# Patient Record
Sex: MALE | Race: WHITE
[De-identification: names, ages, dates, MRNs, and addresses within clinical notes are randomized per-mention and may not be internally consistent; named-entity substitution may affect disease eponyms.]

---

## 2020-08-14 ENCOUNTER — ERX REFILL RESPONSE (OUTPATIENT)
Age: 50
End: 2020-08-14

## 2020-08-14 RX ORDER — ONDANSETRON 4 MG/1
DISSOLVE ONE TABLET ON THE TONGUE THREE TIMES DAILY AS NEEDED TABLET, ORALLY DISINTEGRATING ORAL
Qty: 60 | Refills: 0

## 2020-09-17 ENCOUNTER — OFFICE VISIT (OUTPATIENT)
Dept: URBAN - METROPOLITAN AREA CLINIC 96 | Facility: CLINIC | Age: 50
End: 2020-09-17

## 2021-09-05 ENCOUNTER — WEB ENCOUNTER (OUTPATIENT)
Dept: URBAN - METROPOLITAN AREA CLINIC 113 | Facility: CLINIC | Age: 51
End: 2021-09-05

## 2021-09-13 ENCOUNTER — WEB ENCOUNTER (OUTPATIENT)
Dept: URBAN - METROPOLITAN AREA CLINIC 113 | Facility: CLINIC | Age: 51
End: 2021-09-13

## 2021-09-13 ENCOUNTER — OFFICE VISIT (OUTPATIENT)
Dept: URBAN - METROPOLITAN AREA CLINIC 113 | Facility: CLINIC | Age: 51
End: 2021-09-13
Payer: COMMERCIAL

## 2021-09-13 VITALS
BODY MASS INDEX: 32.59 KG/M2 | WEIGHT: 276 LBS | HEIGHT: 77 IN | HEART RATE: 79 BPM | SYSTOLIC BLOOD PRESSURE: 143 MMHG | DIASTOLIC BLOOD PRESSURE: 88 MMHG | TEMPERATURE: 97.5 F

## 2021-09-13 DIAGNOSIS — K58.0 IRRITABLE BOWEL SYNDROME WITH DIARRHEA: ICD-10-CM

## 2021-09-13 DIAGNOSIS — E83.119 HEMOCHROMATOSIS, UNSPECIFIED: ICD-10-CM

## 2021-09-13 DIAGNOSIS — K64.8 INTERNAL HEMORRHOIDS: ICD-10-CM

## 2021-09-13 DIAGNOSIS — K62.5 BRIGHT RED BLOOD PER RECTUM: ICD-10-CM

## 2021-09-13 PROCEDURE — 99214 OFFICE O/P EST MOD 30 MIN: CPT | Performed by: INTERNAL MEDICINE

## 2021-09-13 RX ORDER — ATENOLOL 25 MG/1
1 TABLET TABLET ORAL ONCE A DAY
Status: ACTIVE | COMMUNITY

## 2021-09-13 RX ORDER — ONDANSETRON 4 MG/1
DISSOLVE ONE TABLET ON THE TONGUE THREE TIMES DAILY AS NEEDED TABLET, ORALLY DISINTEGRATING ORAL
Qty: 60 | Refills: 0 | Status: ON HOLD | COMMUNITY

## 2021-09-13 RX ORDER — DESVENLAFAXINE 100 MG/1
1 TABLET TABLET, EXTENDED RELEASE ORAL ONCE A DAY
Status: ACTIVE | COMMUNITY

## 2021-09-13 NOTE — HPI-TODAY'S VISIT:
50yo male with a history of hemochromatosis, diarrhea predominant irritable bowel syndrome, complicated internal hemorrhoids s/p banding x3, adenomatous colon polyps due surveillance in 2022, presenting to [facility]. He was previously managed in the [facility] office by Dr. Jmienez.  He has a history of IBS-D, s/p cholecystectomy. In the past, his symptoms have responded to both Xifaxan and cholestyramine. He is having a daily nonbloody stool with Imodium 1 tablet as needed. About two weeks ago, he felt a "pop" in the rectum during a bowel movement. This resulted in the passage of a large volume of red blood. He started preparation H cream and suppositories and his symptoms have nearly subsided within the last 72 hours. He denies rectal pain. No abdominal pain, nausea or vomiting. He has a history of hemochromatosis and has received many phlebotomies over the years. He most recently underwent a double red cell (FRANCK) donation in May in [place].

## 2021-09-14 LAB
BASO (ABSOLUTE): 0.1
BASOS: 1
EOS (ABSOLUTE): 0.1
EOS: 1
FERRITIN, SERUM: 771
HEMATOCRIT: 46.6
HEMATOLOGY COMMENTS:: (no result)
HEMOGLOBIN: 16
IMMATURE CELLS: (no result)
IMMATURE GRANS (ABS): 0
IMMATURE GRANULOCYTES: 0
IRON BIND.CAP.(TIBC): 267
IRON SATURATION: 72
IRON: 193
LYMPHS (ABSOLUTE): 2.2
LYMPHS: 30
MCH: 34.4
MCHC: 34.3
MCV: 100
MONOCYTES(ABSOLUTE): 0.5
MONOCYTES: 7
NEUTROPHILS (ABSOLUTE): 4.3
NEUTROPHILS: 61
NRBC: (no result)
PLATELETS: 173
RBC: 4.65
RDW: 12
UIBC: 74
WBC: 7.1

## 2021-09-21 ENCOUNTER — WEB ENCOUNTER (OUTPATIENT)
Dept: URBAN - METROPOLITAN AREA CLINIC 113 | Facility: CLINIC | Age: 51
End: 2021-09-21

## 2021-09-21 ENCOUNTER — TELEPHONE ENCOUNTER (OUTPATIENT)
Dept: URBAN - METROPOLITAN AREA CLINIC 113 | Facility: CLINIC | Age: 51
End: 2021-09-21

## 2021-09-29 ENCOUNTER — WEB ENCOUNTER (OUTPATIENT)
Dept: URBAN - METROPOLITAN AREA CLINIC 113 | Facility: CLINIC | Age: 51
End: 2021-09-29

## 2021-09-29 RX ORDER — ONDANSETRON 4 MG/1
DISSOLVE ONE TABLET ON THE TONGUE THREE TIMES DAILY AS NEEDED TABLET, ORALLY DISINTEGRATING ORAL ONCE A DAY
Qty: 30 | Refills: 0

## 2021-10-04 ENCOUNTER — OFFICE VISIT (OUTPATIENT)
Dept: URBAN - METROPOLITAN AREA CLINIC 113 | Facility: CLINIC | Age: 51
End: 2021-10-04

## 2021-12-28 ENCOUNTER — LAB OUTSIDE AN ENCOUNTER (OUTPATIENT)
Dept: URBAN - METROPOLITAN AREA CLINIC 113 | Facility: CLINIC | Age: 51
End: 2021-12-28

## 2021-12-28 ENCOUNTER — OFFICE VISIT (OUTPATIENT)
Dept: URBAN - METROPOLITAN AREA CLINIC 113 | Facility: CLINIC | Age: 51
End: 2021-12-28
Payer: COMMERCIAL

## 2021-12-28 VITALS
HEIGHT: 77 IN | WEIGHT: 275 LBS | RESPIRATION RATE: 18 BRPM | SYSTOLIC BLOOD PRESSURE: 138 MMHG | DIASTOLIC BLOOD PRESSURE: 83 MMHG | HEART RATE: 79 BPM | BODY MASS INDEX: 32.47 KG/M2 | TEMPERATURE: 97.1 F

## 2021-12-28 DIAGNOSIS — D12.6 ADENOMATOUS POLYP OF COLON, UNSPECIFIED PART OF COLON: ICD-10-CM

## 2021-12-28 DIAGNOSIS — K58.0 IRRITABLE BOWEL SYNDROME WITH DIARRHEA: ICD-10-CM

## 2021-12-28 DIAGNOSIS — K64.9 HEMORRHOIDS: ICD-10-CM

## 2021-12-28 DIAGNOSIS — E83.119 HEMOCHROMATOSIS, UNSPECIFIED: ICD-10-CM

## 2021-12-28 PROCEDURE — 99214 OFFICE O/P EST MOD 30 MIN: CPT | Performed by: INTERNAL MEDICINE

## 2021-12-28 RX ORDER — INDOMETHACIN 25 MG/1
1 CAPSULE WITH FOOD OR MILK CAPSULE ORAL TWICE A DAY
Status: ACTIVE | COMMUNITY

## 2021-12-28 RX ORDER — COLESEVELAM HYDROCHLORIDE 625 MG/1
1 TABLET TABLET, FILM COATED ORAL ONCE A DAY
Qty: 30 | Refills: 11 | OUTPATIENT
Start: 2021-12-28

## 2021-12-28 RX ORDER — ONDANSETRON 4 MG/1
DISSOLVE ONE TABLET ON THE TONGUE THREE TIMES DAILY AS NEEDED TABLET, ORALLY DISINTEGRATING ORAL ONCE A DAY
Qty: 30 | Refills: 0 | Status: ACTIVE | COMMUNITY

## 2021-12-28 RX ORDER — LOSARTAN POTASSIUM 25 MG/1
1 TABLET TABLET ORAL ONCE A DAY
Status: ACTIVE | COMMUNITY

## 2021-12-28 RX ORDER — ATENOLOL 25 MG/1
1 TABLET TABLET ORAL ONCE A DAY
Status: ACTIVE | COMMUNITY

## 2021-12-28 RX ORDER — SODIUM, POTASSIUM,MAG SULFATES 17.5-3.13G
354 ML SOLUTION, RECONSTITUTED, ORAL ORAL ONCE
Qty: 354 MILLILITER | Refills: 0 | OUTPATIENT
Start: 2021-12-28 | End: 2021-12-29

## 2021-12-28 RX ORDER — DESVENLAFAXINE 100 MG/1
1 TABLET TABLET, EXTENDED RELEASE ORAL ONCE A DAY
Status: ACTIVE | COMMUNITY

## 2021-12-28 RX ORDER — DESVENLAFAXINE 50 MG/1
1 TABLET TABLET, EXTENDED RELEASE ORAL ONCE A DAY
Status: ACTIVE | COMMUNITY

## 2021-12-28 NOTE — HPI-TODAY'S VISIT:
50yo male with a history of hemochromatosis, diarrhea predominant irritable bowel syndrome, complicated internal hemorrhoids s/p banding x3, adenomatous colon polyps due surveillance in 2022, presenting for followup. He was previously managed in the [facility] office by Dr. Jimenez. . He has a history of IBS-D, s/p cholecystectomy. In the past, his symptoms have responded to both Xifaxan and cholestyramine. He is having a daily nonbloody stool with Imodium 1 tablet as needed.  . No abdominal pain, nausea or vomiting. He has a history of hemochromatosis and has received many phlebotomies over the years.  . He most recently underwent a double red cell (FRANCK) donation in June in [place]. Last run was delayed due to his father's death. . Labs December 2021 albumin 4.5, total bilirubin 1.1, alkaline phosphatase 78, AST 24, ALT 36.  Hemoglobin 17.1, platelets 230,000.  Ferritin 544.  Hemoglobin A1c 4.7.  Uric acid normal at 7.0  . Flexible anoscopy 2018.  Normal.  Biopsies were notable for mild ischemic colitis interestingly .  Hemorrhoidal banding performed 2017 . Colonoscopy 2017.  5 mm transverse colonic polyp.  Random biopsies were normal. . CT enterography - September 2015

## 2022-03-15 ENCOUNTER — TELEPHONE ENCOUNTER (OUTPATIENT)
Dept: URBAN - METROPOLITAN AREA CLINIC 113 | Facility: CLINIC | Age: 52
End: 2022-03-15

## 2022-03-21 ENCOUNTER — OFFICE VISIT (OUTPATIENT)
Dept: URBAN - METROPOLITAN AREA SURGERY CENTER 25 | Facility: SURGERY CENTER | Age: 52
End: 2022-03-21

## 2022-05-18 ENCOUNTER — WEB ENCOUNTER (OUTPATIENT)
Dept: URBAN - METROPOLITAN AREA CLINIC 113 | Facility: CLINIC | Age: 52
End: 2022-05-18

## 2022-05-18 RX ORDER — ONDANSETRON 4 MG/1
DISSOLVE ONE TABLET ON THE TONGUE THREE TIMES DAILY AS NEEDED TABLET, ORALLY DISINTEGRATING ORAL ONCE A DAY
Qty: 30 | Refills: 0

## 2022-08-03 ENCOUNTER — ERX REFILL RESPONSE (OUTPATIENT)
Dept: URBAN - METROPOLITAN AREA CLINIC 113 | Facility: CLINIC | Age: 52
End: 2022-08-03

## 2022-08-03 RX ORDER — ONDANSETRON 4 MG/1
DISSOLVE ONE TABLET ON THE TONGUE THREE TIMES DAILY AS NEEDED TABLET, ORALLY DISINTEGRATING ORAL ONCE A DAY
Qty: 30 | Refills: 0 | OUTPATIENT

## 2022-08-03 RX ORDER — ONDANSETRON 4 MG/1
DISSOLVE ONE TABLET ON THE TONGUE THREE TIMES A DAY AS NEEDED TABLET, ORALLY DISINTEGRATING ORAL
Qty: 30 TABLET | Refills: 11 | OUTPATIENT

## 2022-08-15 ENCOUNTER — OFFICE VISIT (OUTPATIENT)
Dept: URBAN - METROPOLITAN AREA SURGERY CENTER 25 | Facility: SURGERY CENTER | Age: 52
End: 2022-08-15
Payer: COMMERCIAL

## 2022-08-15 ENCOUNTER — CLAIMS CREATED FROM THE CLAIM WINDOW (OUTPATIENT)
Dept: URBAN - METROPOLITAN AREA CLINIC 4 | Facility: CLINIC | Age: 52
End: 2022-08-15
Payer: COMMERCIAL

## 2022-08-15 ENCOUNTER — LAB OUTSIDE AN ENCOUNTER (OUTPATIENT)
Dept: URBAN - METROPOLITAN AREA CLINIC 113 | Facility: CLINIC | Age: 52
End: 2022-08-15

## 2022-08-15 ENCOUNTER — TELEPHONE ENCOUNTER (OUTPATIENT)
Dept: URBAN - METROPOLITAN AREA CLINIC 113 | Facility: CLINIC | Age: 52
End: 2022-08-15

## 2022-08-15 DIAGNOSIS — K63.89 OTHER SPECIFIED DISEASES OF INTESTINE: ICD-10-CM

## 2022-08-15 DIAGNOSIS — D12.4 ADENOMA OF DESCENDING COLON: ICD-10-CM

## 2022-08-15 DIAGNOSIS — Z86.010 ADENOMAS PERSONAL HISTORY OF COLONIC POLYPS: ICD-10-CM

## 2022-08-15 DIAGNOSIS — D12.4 BENIGN NEOPLASM OF DESCENDING COLON: ICD-10-CM

## 2022-08-15 PROCEDURE — 88305 TISSUE EXAM BY PATHOLOGIST: CPT | Performed by: PATHOLOGY

## 2022-08-15 PROCEDURE — 45380 COLONOSCOPY AND BIOPSY: CPT | Performed by: INTERNAL MEDICINE

## 2022-08-15 PROCEDURE — G8907 PT DOC NO EVENTS ON DISCHARG: HCPCS | Performed by: INTERNAL MEDICINE

## 2022-08-15 RX ORDER — ONDANSETRON 4 MG/1
DISSOLVE ONE TABLET ON THE TONGUE THREE TIMES A DAY AS NEEDED TABLET, ORALLY DISINTEGRATING ORAL
Qty: 30 TABLET | Refills: 11 | Status: ACTIVE | COMMUNITY

## 2022-08-15 RX ORDER — DESVENLAFAXINE 100 MG/1
1 TABLET TABLET, EXTENDED RELEASE ORAL ONCE A DAY
Status: ACTIVE | COMMUNITY

## 2022-08-15 RX ORDER — LOSARTAN POTASSIUM 25 MG/1
1 TABLET TABLET ORAL ONCE A DAY
Status: ACTIVE | COMMUNITY

## 2022-08-15 RX ORDER — DESVENLAFAXINE 50 MG/1
1 TABLET TABLET, EXTENDED RELEASE ORAL ONCE A DAY
Status: ACTIVE | COMMUNITY

## 2022-08-15 RX ORDER — ATENOLOL 25 MG/1
1 TABLET TABLET ORAL ONCE A DAY
Status: ACTIVE | COMMUNITY

## 2022-08-15 RX ORDER — INDOMETHACIN 25 MG/1
1 CAPSULE WITH FOOD OR MILK CAPSULE ORAL TWICE A DAY
Status: ACTIVE | COMMUNITY

## 2022-08-15 RX ORDER — COLESEVELAM HYDROCHLORIDE 625 MG/1
1 TABLET TABLET, FILM COATED ORAL ONCE A DAY
Qty: 30 | Refills: 11 | Status: ACTIVE | COMMUNITY
Start: 2021-12-28

## 2022-08-17 ENCOUNTER — TELEPHONE ENCOUNTER (OUTPATIENT)
Dept: URBAN - METROPOLITAN AREA CLINIC 113 | Facility: CLINIC | Age: 52
End: 2022-08-17

## 2022-12-01 ENCOUNTER — LAB OUTSIDE AN ENCOUNTER (OUTPATIENT)
Dept: URBAN - METROPOLITAN AREA CLINIC 113 | Facility: CLINIC | Age: 52
End: 2022-12-01

## 2022-12-01 ENCOUNTER — OFFICE VISIT (OUTPATIENT)
Dept: URBAN - METROPOLITAN AREA CLINIC 113 | Facility: CLINIC | Age: 52
End: 2022-12-01
Payer: COMMERCIAL

## 2022-12-01 VITALS
HEART RATE: 73 BPM | HEIGHT: 77 IN | TEMPERATURE: 98.3 F | RESPIRATION RATE: 20 BRPM | SYSTOLIC BLOOD PRESSURE: 133 MMHG | DIASTOLIC BLOOD PRESSURE: 83 MMHG | WEIGHT: 272 LBS | BODY MASS INDEX: 32.12 KG/M2

## 2022-12-01 DIAGNOSIS — R21 RASH: ICD-10-CM

## 2022-12-01 DIAGNOSIS — E83.119 HEMOCHROMATOSIS, UNSPECIFIED: ICD-10-CM

## 2022-12-01 DIAGNOSIS — D12.6 ADENOMATOUS POLYP OF COLON, UNSPECIFIED PART OF COLON: ICD-10-CM

## 2022-12-01 DIAGNOSIS — K64.9 HEMORRHOIDS: ICD-10-CM

## 2022-12-01 DIAGNOSIS — Z86.010 ADENOMAS PERSONAL HISTORY OF COLONIC POLYPS: ICD-10-CM

## 2022-12-01 DIAGNOSIS — K58.0 IRRITABLE BOWEL SYNDROME WITH DIARRHEA: ICD-10-CM

## 2022-12-01 PROCEDURE — 99214 OFFICE O/P EST MOD 30 MIN: CPT | Performed by: INTERNAL MEDICINE

## 2022-12-01 RX ORDER — INDOMETHACIN 25 MG/1
1 CAPSULE WITH FOOD OR MILK CAPSULE ORAL TWICE A DAY
Status: DISCONTINUED | COMMUNITY

## 2022-12-01 RX ORDER — NYSTATIN 100000 [USP'U]/G
1 APPLICATION POWDER TOPICAL TWICE A DAY
Qty: 60 GM | Refills: 3 | OUTPATIENT
Start: 2022-12-01 | End: 2023-03-31

## 2022-12-01 RX ORDER — DESVENLAFAXINE 50 MG/1
1 TABLET TABLET, EXTENDED RELEASE ORAL ONCE A DAY
Status: ACTIVE | COMMUNITY

## 2022-12-01 RX ORDER — ALLOPURINOL 200 MG/1
1 TABLET TABLET ORAL ONCE A DAY
Status: ACTIVE | COMMUNITY

## 2022-12-01 RX ORDER — DESVENLAFAXINE 100 MG/1
1 TABLET TABLET, EXTENDED RELEASE ORAL ONCE A DAY
Status: ACTIVE | COMMUNITY

## 2022-12-01 RX ORDER — LORAZEPAM 0.5 MG/1
1 TABLET AT BEDTIME AS NEEDED TABLET ORAL ONCE A DAY
Status: ACTIVE | COMMUNITY

## 2022-12-01 RX ORDER — COLESEVELAM HYDROCHLORIDE 625 MG/1
1 TABLET TABLET, FILM COATED ORAL ONCE A DAY
Qty: 30 | Refills: 11 | Status: ACTIVE | COMMUNITY
Start: 2021-12-28

## 2022-12-01 RX ORDER — ONDANSETRON 4 MG/1
DISSOLVE ONE TABLET ON THE TONGUE THREE TIMES A DAY AS NEEDED TABLET, ORALLY DISINTEGRATING ORAL
Qty: 30 TABLET | Refills: 11 | Status: ACTIVE | COMMUNITY

## 2022-12-01 RX ORDER — ATENOLOL 50 MG/1
1 TABLET TABLET ORAL ONCE A DAY
Status: ACTIVE | COMMUNITY

## 2022-12-01 RX ORDER — LOSARTAN POTASSIUM 25 MG/1
1 TABLET TABLET ORAL ONCE A DAY
Status: ACTIVE | COMMUNITY

## 2022-12-01 NOTE — HPI-TODAY'S VISIT:
51yo male with a history of hemochromatosis, diarrhea predominant irritable bowel syndrome, complicated internal hemorrhoids s/p banding x3, adenomatous colon polyps due surveillance in 2022, presenting for followup. He was previously managed in the [facility] office by Dr. Jimenez. . He has a history of IBS-D, s/p cholecystectomy. In the past, his symptoms have responded to both Xifaxan and cholestyramine. He is having a daily nonbloody stool with Imodium 1 tablet as needed. Now diarrhea is much better on welchol daily. He uses ondansetron prn with good effect. . He has a history of hemochromatosis and has received many phlebotomies over the years.  . He most recently underwent a double red cell (FRANCK) donation in June in [place]. Last run was delayed due to his father's death. . Labs December 2021 albumin 4.5, total bilirubin 1.1, alkaline phosphatase 78, AST 24, ALT 36.  Hemoglobin 17.1, platelets 230,000.  Ferritin 544.  Hemoglobin A1c 4.7.  Uric acid normal at 7.0  . Flexible anoscopy 2018.  Normal.  Biopsies were notable for mild ischemic colitis interestingly . Hemorrhoidal banding performed 2017 . Colonoscopy 2017.  5 mm transverse colonic polyp.  Random biopsies were normal. . CT enterography - September 2015

## 2022-12-01 NOTE — HPI-TODAY'S VISIT:
. Labs August 2022.  Iron 195, iron saturation 69%, ferritin 892. . Labs August 2022.  Iron 195, iron saturation 69%, ferritin 892.Colonoscopy August 2022.  2 mm descending colon polyp.  Normal colonic mucosa.  Random biopsies were negative for colitis.  The polyp was adenomatous. . Labs April 2022.  ALT 38, AST 25, alkaline phosphatase 111.  TSH 1.2.  Ferritin 699, iron 237.  Hemoglobin 16.1.  Hemoglobin 15.6 . EGD ~ 2012 [facility]. Normal. Per his report

## 2022-12-02 LAB
A/G RATIO: 2.5
AFP, SERUM, TUMOR MARKER: 3
ALBUMIN: 4.3
ALKALINE PHOSPHATASE: 79
ALT (SGPT): 42
AST (SGOT): 26
BILIRUBIN, TOTAL: 0.7
BUN/CREATININE RATIO: (no result)
BUN: 10
CALCIUM: 9.1
CARBON DIOXIDE, TOTAL: 23
CHLORIDE: 105
CREATININE: 0.84
EGFR: 104
FERRITIN, SERUM: 819
GLOBULIN, TOTAL: 1.7
GLUCOSE: 87
HEMATOCRIT: 42.7
HEMOGLOBIN: 15.3
IMMUNOGLOBULIN A: 82
IMMUNOGLOBULIN A: 82
IMMUNOGLOBULIN G: 590
IMMUNOGLOBULIN M: 54
INR: 0.9
INTERPRETATION: (no result)
IRON BIND.CAP.(TIBC): 269
IRON SATURATION: 76
IRON: 204
MCH: 35.4
MCHC: 35.8
MCV: 98.8
MPV: 10.3
PLATELET COUNT: 187
POTASSIUM: 4.1
PROTEIN, TOTAL: 6
PT: 9.8
RDW: 11.8
RED BLOOD CELL COUNT: 4.32
SODIUM: 137
TISSUE TRANSGLUTAMINASE AB, IGA: <1
WHITE BLOOD CELL COUNT: 7.1

## 2022-12-06 PROBLEM — 428283002 HISTORY OF POLYP OF COLON: Status: ACTIVE | Noted: 2022-08-23

## 2022-12-06 PROBLEM — 92065004 BENIGN NEOPLASM OF COLON: Status: ACTIVE | Noted: 2021-12-28

## 2022-12-06 PROBLEM — 197125005 IRRITABLE BOWEL SYNDROME WITH DIARRHEA: Status: ACTIVE | Noted: 2021-09-13

## 2022-12-15 ENCOUNTER — TELEPHONE ENCOUNTER (OUTPATIENT)
Dept: URBAN - METROPOLITAN AREA CLINIC 113 | Facility: CLINIC | Age: 52
End: 2022-12-15

## 2022-12-15 ENCOUNTER — LAB OUTSIDE AN ENCOUNTER (OUTPATIENT)
Dept: URBAN - METROPOLITAN AREA CLINIC 113 | Facility: CLINIC | Age: 52
End: 2022-12-15

## 2022-12-30 ENCOUNTER — WEB ENCOUNTER (OUTPATIENT)
Dept: URBAN - METROPOLITAN AREA SURGERY CENTER 25 | Facility: SURGERY CENTER | Age: 52
End: 2022-12-30

## 2023-01-11 ENCOUNTER — WEB ENCOUNTER (OUTPATIENT)
Dept: URBAN - METROPOLITAN AREA CLINIC 113 | Facility: CLINIC | Age: 53
End: 2023-01-11

## 2023-01-12 ENCOUNTER — LAB OUTSIDE AN ENCOUNTER (OUTPATIENT)
Dept: URBAN - METROPOLITAN AREA CLINIC 113 | Facility: CLINIC | Age: 53
End: 2023-01-12

## 2023-02-01 ENCOUNTER — LAB OUTSIDE AN ENCOUNTER (OUTPATIENT)
Dept: URBAN - METROPOLITAN AREA SURGERY CENTER 25 | Facility: SURGERY CENTER | Age: 53
End: 2023-02-01

## 2023-02-01 PROBLEM — 399187006 HEMOCHROMATOSIS: Status: ACTIVE | Noted: 2021-09-13

## 2023-02-01 PROBLEM — 85057007 LIVER CYST: Status: ACTIVE | Noted: 2023-02-01

## 2023-02-07 ENCOUNTER — WEB ENCOUNTER (OUTPATIENT)
Dept: URBAN - METROPOLITAN AREA CLINIC 113 | Facility: CLINIC | Age: 53
End: 2023-02-07

## 2023-02-07 ENCOUNTER — WEB ENCOUNTER (OUTPATIENT)
Dept: URBAN - METROPOLITAN AREA SURGERY CENTER 25 | Facility: SURGERY CENTER | Age: 53
End: 2023-02-07

## 2023-03-13 ENCOUNTER — OFFICE VISIT (OUTPATIENT)
Dept: URBAN - METROPOLITAN AREA CLINIC 113 | Facility: CLINIC | Age: 53
End: 2023-03-13
Payer: COMMERCIAL

## 2023-03-13 VITALS
RESPIRATION RATE: 20 BRPM | HEIGHT: 77 IN | SYSTOLIC BLOOD PRESSURE: 138 MMHG | DIASTOLIC BLOOD PRESSURE: 74 MMHG | HEART RATE: 89 BPM | WEIGHT: 278.2 LBS | TEMPERATURE: 97.3 F | BODY MASS INDEX: 32.85 KG/M2

## 2023-03-13 DIAGNOSIS — D12.6 ADENOMATOUS POLYP OF COLON, UNSPECIFIED PART OF COLON: ICD-10-CM

## 2023-03-13 DIAGNOSIS — R21 RASH: ICD-10-CM

## 2023-03-13 DIAGNOSIS — N28.89 KIDNEY MASS: ICD-10-CM

## 2023-03-13 DIAGNOSIS — K64.9 HEMORRHOIDS: ICD-10-CM

## 2023-03-13 DIAGNOSIS — E83.119 HEMOCHROMATOSIS, UNSPECIFIED: ICD-10-CM

## 2023-03-13 DIAGNOSIS — K76.89 LIVER CYST: ICD-10-CM

## 2023-03-13 DIAGNOSIS — K58.0 IRRITABLE BOWEL SYNDROME WITH DIARRHEA: ICD-10-CM

## 2023-03-13 DIAGNOSIS — Z86.010 ADENOMAS PERSONAL HISTORY OF COLONIC POLYPS: ICD-10-CM

## 2023-03-13 PROCEDURE — 99214 OFFICE O/P EST MOD 30 MIN: CPT | Performed by: INTERNAL MEDICINE

## 2023-03-13 RX ORDER — COLESEVELAM HYDROCHLORIDE 625 MG/1
1 TABLET TABLET, FILM COATED ORAL ONCE A DAY
Qty: 30 | Refills: 11 | Status: ACTIVE | COMMUNITY
Start: 2021-12-28

## 2023-03-13 RX ORDER — ONDANSETRON 4 MG/1
DISSOLVE ONE TABLET ON THE TONGUE THREE TIMES A DAY AS NEEDED TABLET, ORALLY DISINTEGRATING ORAL
Qty: 30 TABLET | Refills: 11 | Status: ACTIVE | COMMUNITY

## 2023-03-13 RX ORDER — LOSARTAN POTASSIUM 25 MG/1
1 TABLET TABLET ORAL ONCE A DAY
Status: ACTIVE | COMMUNITY

## 2023-03-13 RX ORDER — LORAZEPAM 0.5 MG/1
1 TABLET AT BEDTIME AS NEEDED TABLET ORAL ONCE A DAY
Status: ACTIVE | COMMUNITY

## 2023-03-13 RX ORDER — DESVENLAFAXINE 50 MG/1
1 TABLET TABLET, EXTENDED RELEASE ORAL ONCE A DAY
Status: ACTIVE | COMMUNITY

## 2023-03-13 RX ORDER — ALLOPURINOL 200 MG/1
1 TABLET TABLET ORAL ONCE A DAY
Status: ACTIVE | COMMUNITY

## 2023-03-13 RX ORDER — ATENOLOL 50 MG/1
1 TABLET TABLET ORAL ONCE A DAY
Status: ACTIVE | COMMUNITY

## 2023-03-13 RX ORDER — DESVENLAFAXINE 100 MG/1
1 TABLET TABLET, EXTENDED RELEASE ORAL ONCE A DAY
Status: ACTIVE | COMMUNITY

## 2023-03-13 RX ORDER — NYSTATIN 100000 [USP'U]/G
1 APPLICATION POWDER TOPICAL TWICE A DAY
Qty: 60 GM | Refills: 3 | Status: ACTIVE | COMMUNITY
Start: 2022-12-01 | End: 2023-03-31

## 2023-03-13 NOTE — HPI-TODAY'S VISIT:
51yo male with a history of hemochromatosis, diarrhea predominant irritable bowel syndrome, complicated internal hemorrhoids s/p banding x3, adenomatous colon polyps due surveillance in 2022, presenting for followup. He was previously managed in the [facility] office by Dr. Jimenez. . Doing fairly well from a GI perspective.  He does have irritable bowel syndrome.  We talked about the FODMAP diet in some detail today.  No dysphagia heartburn or regurgitation.  No abdominal pain at this time.  Not compliant with phlebotomy.  Busy with work.  Promises me that he will get phlebotomy performed in the second quarter of 2023.  Nausea is responsive to ondansetron. . Now on colesavelam. Uses ondansetron prn for nausea. .

## 2023-03-13 NOTE — HPI-OTHER HISTORIES
. MRI abdomen with and without contrast February 2023.  Right renal lesion corresponding to prominent cortical lobulation.  No mass was noted.  Moderate diffuse hepatic steatosis noted..  Borderline splenomegaly. . Abdominal ultrasound elastography.  December 2022.  Fatty liver noted.  F1.  Elastography score 6.83K PA.  Possible mass seen in the right kidney. . Colonoscopy August 2022.  2 mm sessile descending colon polyp.  This was cold biopsied.  Normal colonic mucosa.  Terminal ileum was normal.  Random biopsies were negative.  Descending polyp was a tubular adenoma. . He has a history of hemochromatosis and has received many phlebotomies over the years.  . He most recently underwent a double red cell (FRANCK) donation in June in [place]. Last run was delayed due to his father's death. . Labs August 2022.  Iron 195, iron saturation 69%, ferritin 892. . Labs August 2022.  Iron 195, iron saturation 69%, ferritin 892.Colonoscopy August 2022.  2 mm descending colon polyp.  Normal colonic mucosa.  Random biopsies were negative for colitis.  The polyp was adenomatous. . Labs April 2022.  ALT 38, AST 25, alkaline phosphatase 111.  TSH 1.2.  Ferritin 699, iron 237.  Hemoglobin 16.1.  Hemoglobin 15.6 . EGD ~ 2012 [facility]. Normal. Per his report . Labs December 2021 albumin 4.5, total bilirubin 1.1, alkaline phosphatase 78, AST 24, ALT 36.  Hemoglobin 17.1, platelets 230,000.  Ferritin 544.  Hemoglobin A1c 4.7.  Uric acid normal at 7.0  . Labs 3/4/21: H/H 17.9/50.3, MCV 99.2. Iron 222, TIBC 292, iron sat 76, ferritin 709. . Flexible anoscopy 2018.  Normal.  Biopsies were notable for mild ischemic colitis interestingly . Hemorrhoidal banding performed 2017 . Colonoscopy 2017.  5 mm transverse colonic polyp.  Random biopsies were normal. . CT enterography - September 2015

## 2023-04-25 ENCOUNTER — WEB ENCOUNTER (OUTPATIENT)
Dept: URBAN - METROPOLITAN AREA CLINIC 113 | Facility: CLINIC | Age: 53
End: 2023-04-25

## 2023-04-29 ENCOUNTER — WEB ENCOUNTER (OUTPATIENT)
Dept: URBAN - METROPOLITAN AREA SURGERY CENTER 25 | Facility: SURGERY CENTER | Age: 53
End: 2023-04-29

## 2023-05-03 ENCOUNTER — WEB ENCOUNTER (OUTPATIENT)
Dept: URBAN - METROPOLITAN AREA SURGERY CENTER 25 | Facility: SURGERY CENTER | Age: 53
End: 2023-05-03

## 2023-05-11 ENCOUNTER — WEB ENCOUNTER (OUTPATIENT)
Dept: URBAN - METROPOLITAN AREA SURGERY CENTER 25 | Facility: SURGERY CENTER | Age: 53
End: 2023-05-11

## 2023-05-24 ENCOUNTER — WEB ENCOUNTER (OUTPATIENT)
Dept: URBAN - METROPOLITAN AREA SURGERY CENTER 25 | Facility: SURGERY CENTER | Age: 53
End: 2023-05-24

## 2023-08-04 ENCOUNTER — ERX REFILL RESPONSE (OUTPATIENT)
Dept: URBAN - METROPOLITAN AREA CLINIC 113 | Facility: CLINIC | Age: 53
End: 2023-08-04

## 2023-08-04 RX ORDER — ONDANSETRON 4 MG/1
DISSOLVE ONE TABLET BY MOUTH THREE TIMES A DAY AS NEEDED TABLET, ORALLY DISINTEGRATING ORAL
Qty: 30 TABLET | Refills: 11 | OUTPATIENT

## 2023-08-04 RX ORDER — ONDANSETRON 4 MG/1
DISSOLVE ONE TABLET ON THE TONGUE THREE TIMES A DAY AS NEEDED TABLET, ORALLY DISINTEGRATING ORAL
Qty: 30 TABLET | Refills: 11 | OUTPATIENT

## 2023-09-18 ENCOUNTER — LAB OUTSIDE AN ENCOUNTER (OUTPATIENT)
Dept: URBAN - METROPOLITAN AREA CLINIC 113 | Facility: CLINIC | Age: 53
End: 2023-09-18

## 2023-09-18 ENCOUNTER — OFFICE VISIT (OUTPATIENT)
Dept: URBAN - METROPOLITAN AREA CLINIC 113 | Facility: CLINIC | Age: 53
End: 2023-09-18
Payer: COMMERCIAL

## 2023-09-18 VITALS
WEIGHT: 286.4 LBS | BODY MASS INDEX: 33.82 KG/M2 | SYSTOLIC BLOOD PRESSURE: 142 MMHG | DIASTOLIC BLOOD PRESSURE: 85 MMHG | HEART RATE: 72 BPM | HEIGHT: 77 IN | TEMPERATURE: 97.1 F

## 2023-09-18 DIAGNOSIS — K58.0 IRRITABLE BOWEL SYNDROME WITH DIARRHEA: ICD-10-CM

## 2023-09-18 DIAGNOSIS — E78.5 HYPERLIPIDEMIA, UNSPECIFIED HYPERLIPIDEMIA TYPE: ICD-10-CM

## 2023-09-18 DIAGNOSIS — E83.110 HEREDITARY HEMOCHROMATOSIS: ICD-10-CM

## 2023-09-18 PROBLEM — 55822004: Status: ACTIVE | Noted: 2023-09-18

## 2023-09-18 PROCEDURE — 99214 OFFICE O/P EST MOD 30 MIN: CPT | Performed by: NURSE PRACTITIONER

## 2023-09-18 RX ORDER — ONDANSETRON 4 MG/1
DISSOLVE ONE TABLET BY MOUTH THREE TIMES A DAY AS NEEDED TABLET, ORALLY DISINTEGRATING ORAL
Qty: 30 TABLET | Refills: 11 | Status: ACTIVE | COMMUNITY

## 2023-09-18 RX ORDER — LOSARTAN POTASSIUM 50 MG/1
1 TABLET TABLET ORAL ONCE A DAY
Status: ACTIVE | COMMUNITY

## 2023-09-18 RX ORDER — LORAZEPAM 0.5 MG/1
1 TABLET AT BEDTIME AS NEEDED TABLET ORAL ONCE A DAY
Status: ON HOLD | COMMUNITY

## 2023-09-18 RX ORDER — COLESEVELAM HYDROCHLORIDE 625 MG/1
1 TABLET TABLET, FILM COATED ORAL ONCE A DAY
Qty: 30 | Refills: 11 | Status: ON HOLD | COMMUNITY
Start: 2021-12-28

## 2023-09-18 RX ORDER — ATENOLOL 50 MG/1
1 TABLET TABLET ORAL ONCE A DAY
Status: ACTIVE | COMMUNITY

## 2023-09-18 RX ORDER — ALLOPURINOL 200 MG/1
1 TABLET TABLET ORAL ONCE A DAY
Status: ACTIVE | COMMUNITY

## 2023-09-18 RX ORDER — DESVENLAFAXINE 100 MG/1
1 TABLET TABLET, EXTENDED RELEASE ORAL ONCE A DAY
Status: ACTIVE | COMMUNITY

## 2023-09-18 RX ORDER — LOSARTAN POTASSIUM 25 MG/1
1 TABLET TABLET ORAL ONCE A DAY
Status: ON HOLD | COMMUNITY

## 2023-09-18 RX ORDER — DESVENLAFAXINE 50 MG/1
1 TABLET TABLET, EXTENDED RELEASE ORAL ONCE A DAY
Status: ON HOLD | COMMUNITY

## 2023-09-18 NOTE — HPI-TODAY'S VISIT:
He was seen in the office in March for routine follow-up regarding hemochromatosis and IBS-D.  Recent ultrasound with elastography was negative for fibrosis.  He was encouraged to improve compliance with phlebotomy, with plan for phlebotomy weekly x4 then every other week x4 until his ferritin was under 100.  His symptoms related to IBS-D were overall managed with WelChol.  He was encouraged a low FODMAP diet. He is doing fairly well from a GI standpoint. His symptoms related to irritable bowel syndrome are overall managed with diet. No abdominal pain, nausea or vomiting. His diarrhea is managed with Imodium 1-2x per week. He has not experienced any hemorrhoid issues since the time of the last visit. No rectal bleeding. His occasional nausea, which is worsened with air travel, responds to ondansetron when needed. He completed a few rounds of phlebotomy over the summer and resumed double [facility]CK[facility]. He is only able to donate RBCs every 90 days. Most recent labs within the chart from December 2022 show H/H15.3/42.7, MCV 98.8, , WBC 7.1.  CMP unremarkable with normal LFTs, AST 26, ALT 42, ALP 79, T. bili 0.7.  Ferritin 819.  aFP 3.0.  Iron 204, TIBC 269, iron sat 76.  Negative celiac serologies.  PT/INR 9.8/0.9.

## 2023-09-18 NOTE — HPI-OTHER HISTORIES
. MRI abdomen with and without contrast February 2023.  Right renal lesion corresponding to prominent cortical lobulation.  No mass was noted.  Moderate diffuse hepatic steatosis noted..  Borderline splenomegaly. . Abdominal ultrasound elastography.  December 2022.  Fatty liver noted.  F1.  Elastography score 6.83K PA.  Possible mass seen in the right kidney. . Colonoscopy August 2022.  2 mm sessile descending colon polyp.  This was cold biopsied.  Normal colonic mucosa.  Terminal ileum was normal.  Random biopsies were negative.  Descending polyp was a tubular adenoma. . He has a history of hemochromatosis and has received many phlebotomies over the years.  . He most recently underwent a double red cell (FRANCK) donation in June in [place]. Last run was delayed due to his father's death. . Labs August 2022.  Iron 195, iron saturation 69%, ferritin 892. . Labs August 2022.  Iron 195, iron saturation 69%, ferritin 892.Colonoscopy August 2022.  2 mm descending colon polyp.  Normal colonic mucosa.  Random biopsies were negative for colitis.  The polyp was adenomatous. . Labs April 2022.  ALT 38, AST 25, alkaline phosphatase 111.  TSH 1.2.  Ferritin 699, iron 237.  Hemoglobin 16.1.  Hemoglobin 15.6 . EGD ~ 2012 [facility]. Normal. Per his report . Labs December 2021 albumin 4.5, total bilirubin 1.1, alkaline phosphatase 78, AST 24, ALT 36.  Hemoglobin 17.1, platelets 230,000.  Ferritin 544.  Hemoglobin A1c 4.7.  Uric acid normal at 7.0  . Labs 3/4/21: H/H 17.9/50.3, MCV 99.2. Iron 222, TIBC 292, iron sat 76, ferritin 709. . Flexible anoscopy 2018.  Normal.  Biopsies were notable for mild ischemic colitis interestingly . Hemorrhoidal banding performed 2017 . Colonoscopy 2017.  5 mm transverse colonic polyp.  Random biopsies were normal. . CT enterography - September 2015

## 2023-09-18 NOTE — HPI-TODAY'S VISIT:
52yo male with a history of hemochromatosis, diarrhea predominant irritable bowel syndrome, complicated internal hemorrhoids s/p banding x3, adenomatous colon polyps, presenting for followup.

## 2023-09-19 ENCOUNTER — TELEPHONE ENCOUNTER (OUTPATIENT)
Dept: URBAN - METROPOLITAN AREA CLINIC 113 | Facility: CLINIC | Age: 53
End: 2023-09-19

## 2023-09-19 LAB
A/G RATIO: 2
ABSOLUTE BASOPHILS: 71
ABSOLUTE EOSINOPHILS: 71
ABSOLUTE LYMPHOCYTES: 1617
ABSOLUTE MONOCYTES: 389
ABSOLUTE NEUTROPHILS: 3752
ALBUMIN: 4.3
ALKALINE PHOSPHATASE: 76
ALT (SGPT): 47
AST (SGOT): 36
BASOPHILS: 1.2
BILIRUBIN, TOTAL: 0.8
BUN/CREATININE RATIO: (no result)
BUN: 8
CALCIUM: 9.1
CARBON DIOXIDE, TOTAL: 22
CHLORIDE: 104
CHOL/HDLC RATIO: 4.8
CHOLESTEROL, TOTAL: 216
CREATININE: 0.89
EGFR: 101
EOSINOPHILS: 1.2
FERRITIN, SERUM: 1173
GLOBULIN, TOTAL: 2.1
GLUCOSE: 136
HDL CHOLESTEROL: 45
HEMATOCRIT: 40.4
HEMOGLOBIN: 14.4
IRON BIND.CAP.(TIBC): 293
IRON SATURATION: 90
IRON: 265
LDL CHOLESTEROL CALC: 122
LYMPHOCYTES: 27.4
MCH: 35.8
MCHC: 35.6
MCV: 100.5
MONOCYTES: 6.6
MPV: 10.5
NEUTROPHILS: 63.6
NON HDL CHOLESTEROL: 171
PLATELET COUNT: 179
POTASSIUM: 4.1
PROTEIN, TOTAL: 6.4
RDW: 12.2
RED BLOOD CELL COUNT: 4.02
SODIUM: 137
TRIGLYCERIDES: 344
WHITE BLOOD CELL COUNT: 5.9

## 2023-12-01 ENCOUNTER — LAB OUTSIDE AN ENCOUNTER (OUTPATIENT)
Dept: URBAN - METROPOLITAN AREA CLINIC 113 | Facility: CLINIC | Age: 53
End: 2023-12-01

## 2023-12-11 ENCOUNTER — TELEPHONE ENCOUNTER (OUTPATIENT)
Dept: URBAN - METROPOLITAN AREA CLINIC 6 | Facility: CLINIC | Age: 53
End: 2023-12-11

## 2023-12-18 ENCOUNTER — WEB ENCOUNTER (OUTPATIENT)
Dept: URBAN - METROPOLITAN AREA CLINIC 113 | Facility: CLINIC | Age: 53
End: 2023-12-18

## 2024-02-01 ENCOUNTER — OV EP (OUTPATIENT)
Dept: URBAN - METROPOLITAN AREA CLINIC 113 | Facility: CLINIC | Age: 54
End: 2024-02-01
Payer: COMMERCIAL

## 2024-02-01 ENCOUNTER — LAB (OUTPATIENT)
Dept: URBAN - METROPOLITAN AREA CLINIC 113 | Facility: CLINIC | Age: 54
End: 2024-02-01

## 2024-02-01 VITALS
HEIGHT: 77 IN | TEMPERATURE: 97.5 F | WEIGHT: 283 LBS | HEART RATE: 76 BPM | BODY MASS INDEX: 33.42 KG/M2 | RESPIRATION RATE: 20 BRPM | SYSTOLIC BLOOD PRESSURE: 114 MMHG | DIASTOLIC BLOOD PRESSURE: 68 MMHG

## 2024-02-01 DIAGNOSIS — E83.110 HEREDITARY HEMOCHROMATOSIS: ICD-10-CM

## 2024-02-01 PROCEDURE — 99214 OFFICE O/P EST MOD 30 MIN: CPT | Performed by: NURSE PRACTITIONER

## 2024-02-01 RX ORDER — ALLOPURINOL 200 MG/1
1 TABLET TABLET ORAL ONCE A DAY
Status: ACTIVE | COMMUNITY

## 2024-02-01 RX ORDER — DESVENLAFAXINE 50 MG/1
1 TABLET TABLET, EXTENDED RELEASE ORAL ONCE A DAY
Status: ON HOLD | COMMUNITY

## 2024-02-01 RX ORDER — ONDANSETRON 4 MG/1
DISSOLVE ONE TABLET BY MOUTH THREE TIMES A DAY AS NEEDED TABLET, ORALLY DISINTEGRATING ORAL
Qty: 30 TABLET | Refills: 11 | Status: ACTIVE | COMMUNITY

## 2024-02-01 RX ORDER — ATENOLOL 50 MG/1
1 TABLET TABLET ORAL ONCE A DAY
Status: ACTIVE | COMMUNITY

## 2024-02-01 RX ORDER — LORAZEPAM 0.5 MG/1
1 TABLET AT BEDTIME AS NEEDED TABLET ORAL ONCE A DAY
Status: ON HOLD | COMMUNITY

## 2024-02-01 RX ORDER — LOSARTAN POTASSIUM 25 MG/1
1 TABLET TABLET ORAL ONCE A DAY
Status: ON HOLD | COMMUNITY

## 2024-02-01 RX ORDER — COLESEVELAM HYDROCHLORIDE 625 MG/1
1 TABLET TABLET, FILM COATED ORAL ONCE A DAY
Qty: 30 | Refills: 11 | Status: ON HOLD | COMMUNITY
Start: 2021-12-28

## 2024-02-01 RX ORDER — DESVENLAFAXINE 100 MG/1
1 TABLET TABLET, EXTENDED RELEASE ORAL ONCE A DAY
Status: ON HOLD | COMMUNITY

## 2024-02-01 RX ORDER — LOSARTAN POTASSIUM 50 MG/1
1 TABLET TABLET ORAL ONCE A DAY
Status: ACTIVE | COMMUNITY

## 2024-02-01 NOTE — HPI-OTHER HISTORIES
He completed a few rounds of phlebotomy over the summer 2023 and resumed double red [facility] ([facility]CK[facility]. He is only able to donate RBCs every 90 days.  Labs from December 2022 show H/H15.3/42.7, MCV 98.8, , WBC 7.1. CMP unremarkable with normal LFTs, AST 26, ALT 42, ALP 79, T. bili 0.7. Ferritin 819. aFP 3.0. Iron 204, TIBC 269, iron sat 76. Negative celiac serologies. PT/INR 9.8/0.9.  MRI abdomen with and without contrast February 2023.  Right renal lesion corresponding to prominent cortical lobulation.  No mass was noted.  Moderate diffuse hepatic steatosis noted..  Borderline splenomegaly.  Abdominal ultrasound elastography.  December 2022.  Fatty liver noted.  F1.  Elastography score 6.83K PA.  Possible mass seen in the right kidney.  Colonoscopy August 2022.  2 mm sessile descending colon polyp.  This was cold biopsied.  Normal colonic mucosa.  Terminal ileum was normal.  Random biopsies were negative.  Descending polyp was a tubular adenoma.  He has a history of hemochromatosis and has received many phlebotomies over the years.   He most recently underwent a double red cell (FRANCK) donation in June in [place]. Last run was delayed due to his father's death.  Labs August 2022.  Iron 195, iron saturation 69%, ferritin 892.  Labs August 2022.  Iron 195, iron saturation 69%, ferritin 892.Colonoscopy August 2022.  2 mm descending colon polyp.  Normal colonic mucosa.  Random biopsies were negative for colitis.  The polyp was adenomatous.  Labs April 2022.  ALT 38, AST 25, alkaline phosphatase 111.  TSH 1.2.  Ferritin 699, iron 237.  Hemoglobin 16.1.  Hemoglobin 15.6  EGD ~ 2012 [facility]. Normal. Per his report  Labs December 2021 albumin 4.5, total bilirubin 1.1, alkaline phosphatase 78, AST 24, ALT 36.  Hemoglobin 17.1, platelets 230,000.  Ferritin 544.  Hemoglobin A1c 4.7.  Uric acid normal at 7.0   Labs 3/4/21: H/H 17.9/50.3, MCV 99.2. Iron 222, TIBC 292, iron sat 76, ferritin 709.  Flexible anoscopy 2018.  Normal.  Biopsies were notable for mild ischemic colitis interestingly  Hemorrhoidal banding performed 2017  Colonoscopy 2017.  5 mm transverse colonic polyp.  Random biopsies were normal.  CT enterography - September 2015

## 2024-02-01 NOTE — HPI-TODAY'S VISIT:
52yo male with a history of hemochromatosis, diarrhea predominant irritable bowel syndrome, complicated internal hemorrhoids s/p banding x3, adenomatous colon polyps, presenting for followup.  He was seen in the office in September for follow-up regarding hereditary hemochromatosis. Previous ferritin remained markedly elevated. He had undergone multiple rounds of phlebotomy as well as red cell donations. Labs were planned to trend his hemoglobin, LFTs, and ferritin. Prior US with elastography was negative for fibrosis; this was repeated. His symptoms related to IBS-D were stable with diet and prn Imodium.

## 2024-02-01 NOTE — HPI-TODAY'S VISIT:
RUQ ultrasound with elastography 12/4/2023:Hepatic steatosis, elastography value 7.69 consistent with normal to mild liver stiffness.  Labs 9/18/2023:H/H14.4/40.4, .5, , WBC 5.9.  AST 36, ALT 47, ALP 76, T. bili 0.8, CMP otherwise unremarkable aside from glucose 136.  Iron 265, TIBC 293, iron sat 90.  Ferritin 1173.  He was recommended weekly phlebotomy x 4.  His symptoms remain stable from a GI perspective. [facility]

## 2024-02-02 LAB
A/G RATIO: 2.4
ABSOLUTE BASOPHILS: 53
ABSOLUTE EOSINOPHILS: 212
ABSOLUTE LYMPHOCYTES: 1628
ABSOLUTE MONOCYTES: 260
ABSOLUTE NEUTROPHILS: 3747
ALBUMIN: 4.1
ALKALINE PHOSPHATASE: 82
ALT (SGPT): 54
AST (SGOT): 64
BASOPHILS: 0.9
BILIRUBIN, TOTAL: 0.5
BUN/CREATININE RATIO: (no result)
BUN: 8
CALCIUM: 8.9
CARBON DIOXIDE, TOTAL: 24
CHLORIDE: 103
CREATININE: 0.87
EGFR: 102
EOSINOPHILS: 3.6
FERRITIN, SERUM: 342
GLOBULIN, TOTAL: 1.7
GLUCOSE: 164
HEMATOCRIT: 43.6
HEMOGLOBIN: 15.8
IRON BIND.CAP.(TIBC): 291
IRON SATURATION: 37
IRON: 109
LYMPHOCYTES: 27.6
MCH: 35.7
MCHC: 36.2
MCV: 98.6
MONOCYTES: 4.4
MPV: 10.9
NEUTROPHILS: 63.5
PLATELET COUNT: 151
POTASSIUM: 3.5
PROTEIN, TOTAL: 5.8
RDW: 11.1
RED BLOOD CELL COUNT: 4.42
SODIUM: 139
WHITE BLOOD CELL COUNT: 5.9

## 2024-06-24 ENCOUNTER — DASHBOARD ENCOUNTERS (OUTPATIENT)
Age: 54
End: 2024-06-24

## 2024-06-24 ENCOUNTER — OFFICE VISIT (OUTPATIENT)
Dept: URBAN - METROPOLITAN AREA CLINIC 113 | Facility: CLINIC | Age: 54
End: 2024-06-24
Payer: COMMERCIAL

## 2024-06-24 VITALS
WEIGHT: 277.6 LBS | SYSTOLIC BLOOD PRESSURE: 110 MMHG | DIASTOLIC BLOOD PRESSURE: 70 MMHG | TEMPERATURE: 97.5 F | HEART RATE: 78 BPM | BODY MASS INDEX: 32.78 KG/M2 | RESPIRATION RATE: 20 BRPM | HEIGHT: 77 IN

## 2024-06-24 DIAGNOSIS — E83.110 HEREDITARY HEMOCHROMATOSIS: ICD-10-CM

## 2024-06-24 DIAGNOSIS — K58.0 IRRITABLE BOWEL SYNDROME WITH DIARRHEA: ICD-10-CM

## 2024-06-24 PROCEDURE — 99214 OFFICE O/P EST MOD 30 MIN: CPT | Performed by: NURSE PRACTITIONER

## 2024-06-24 RX ORDER — ATENOLOL 50 MG/1
1 TABLET TABLET ORAL ONCE A DAY
Status: ACTIVE | COMMUNITY

## 2024-06-24 RX ORDER — DESVENLAFAXINE 100 MG/1
1 TABLET TABLET, EXTENDED RELEASE ORAL ONCE A DAY
Status: ON HOLD | COMMUNITY

## 2024-06-24 RX ORDER — COLESEVELAM HYDROCHLORIDE 625 MG/1
1 TABLET TABLET, FILM COATED ORAL ONCE A DAY
Qty: 30 | Refills: 11 | Status: ON HOLD | COMMUNITY
Start: 2021-12-28

## 2024-06-24 RX ORDER — ONDANSETRON 4 MG/1
DISSOLVE ONE TABLET BY MOUTH THREE TIMES A DAY AS NEEDED TABLET, ORALLY DISINTEGRATING ORAL
Qty: 30 TABLET | Refills: 11 | Status: ACTIVE | COMMUNITY

## 2024-06-24 RX ORDER — DESVENLAFAXINE 50 MG/1
1 TABLET TABLET, EXTENDED RELEASE ORAL ONCE A DAY
Status: ON HOLD | COMMUNITY

## 2024-06-24 RX ORDER — ALLOPURINOL 200 MG/1
1 TABLET TABLET ORAL ONCE A DAY
Status: ACTIVE | COMMUNITY

## 2024-06-24 RX ORDER — LOSARTAN POTASSIUM 50 MG/1
1 TABLET TABLET ORAL ONCE A DAY
Status: ACTIVE | COMMUNITY

## 2024-06-24 RX ORDER — LOSARTAN POTASSIUM 25 MG/1
1 TABLET TABLET ORAL ONCE A DAY
Status: ON HOLD | COMMUNITY

## 2024-06-24 RX ORDER — ATORVASTATIN CALCIUM 10 MG/1
1 TABLET TABLET, FILM COATED ORAL ONCE A DAY
Status: ACTIVE | COMMUNITY

## 2024-06-24 RX ORDER — LORAZEPAM 0.5 MG/1
1 TABLET AT BEDTIME AS NEEDED TABLET ORAL ONCE A DAY
Status: ON HOLD | COMMUNITY

## 2024-06-24 NOTE — HPI-TODAY'S VISIT:
54yo male with a history of hemochromatosis, diarrhea predominant irritable bowel syndrome, complicated internal hemorrhoids s/p banding x3, adenomatous colon polyps, presenting for followup. He was seen in the office in February for follow-up regarding hereditary hemochromatosis, s/p multiple rounds of weekly phlebotomy. Labs were planned to trend his hemoglobin, ferritin, and LFTs. Recent US with elastography was negative for clinically significance fibrosis; surveillance imaging was recommended annually. Labs 2/1/24: H/H 15.8/43.6, MCV 98.6, Plt 151, WBC 5.9. AST 64, ALT 54, ALP 82, Tbili 0.5, CMP otherwise unremarkable aside from glucose 164. Iron 109, TIBC 291, iron sat 37. Ferritin 342. He has stretched out his phlebotomy intervals to every 4 weeks. His diarrhea is managed with Metamucil one capsule daily and he has not required Imodium within the last 3 weeks. He denies abdominal pain. No blood in the stool. Labs in May with his PCP show a hemoglobin of 15.2. His LFTs were normal.

## 2024-06-24 NOTE — HPI-OTHER HISTORIES
RUQ ultrasound with elastography 12/4/2023:Hepatic steatosis, elastography value 7.69 consistent with normal to mild liver stiffness.  Labs 9/18/2023:H/H14.4/40.4, .5, , WBC 5.9. AST 36, ALT 47, ALP 76, T. bili 0.8, CMP otherwise unremarkable aside from glucose 136. Iron 265, TIBC 293, iron sat 90. Ferritin 1173. He was recommended weekly phlebotomy x 4.  He completed a few rounds of phlebotomy over the summer 2023 and resumed double red cell (FRANCK) donation [facility]. He is only able to donate RBCs every 90 days.  Labs from December 2022 show H/H15.3/42.7, MCV 98.8, , WBC 7.1. CMP unremarkable with normal LFTs, AST 26, ALT 42, ALP 79, T. bili 0.7. Ferritin 819. aFP 3.0. Iron 204, TIBC 269, iron sat 76. Negative celiac serologies. PT/INR 9.8/0.9.  MRI abdomen with and without contrast February 2023.  Right renal lesion corresponding to prominent cortical lobulation.  No mass was noted.  Moderate diffuse hepatic steatosis noted..  Borderline splenomegaly.  Abdominal ultrasound elastography.  December 2022.  Fatty liver noted.  F1.  Elastography score 6.83K PA.  Possible mass seen in the right kidney.  Colonoscopy August 2022.  2 mm sessile descending colon polyp.  This was cold biopsied.  Normal colonic mucosa.  Terminal ileum was normal.  Random biopsies were negative.  Descending polyp was a tubular adenoma.  He has a history of hemochromatosis and has received many phlebotomies over the years.   He most recently underwent a double red cell (FRANCK) donation in June in [place]. Last run was delayed due to his father's death.  Labs August 2022.  Iron 195, iron saturation 69%, ferritin 892.  Labs August 2022.  Iron 195, iron saturation 69%, ferritin 892.Colonoscopy August 2022.  2 mm descending colon polyp.  Normal colonic mucosa.  Random biopsies were negative for colitis.  The polyp was adenomatous.  Labs April 2022.  ALT 38, AST 25, alkaline phosphatase 111.  TSH 1.2.  Ferritin 699, iron 237.  Hemoglobin 16.1.  Hemoglobin 15.6  EGD ~ 2012 [facility]. Normal. Per his report  Labs December 2021 albumin 4.5, total bilirubin 1.1, alkaline phosphatase 78, AST 24, ALT 36.  Hemoglobin 17.1, platelets 230,000.  Ferritin 544.  Hemoglobin A1c 4.7.  Uric acid normal at 7.0   Labs 3/4/21: H/H 17.9/50.3, MCV 99.2. Iron 222, TIBC 292, iron sat 76, ferritin 709.  Flexible anoscopy 2018.  Normal.  Biopsies were notable for mild ischemic colitis interestingly  Hemorrhoidal banding performed 2017  Colonoscopy 2017.  5 mm transverse colonic polyp.  Random biopsies were normal.  CT enterography - September 2015

## 2024-06-25 LAB
ABSOLUTE BASOPHILS: 62
ABSOLUTE EOSINOPHILS: 97
ABSOLUTE LYMPHOCYTES: 1497
ABSOLUTE MONOCYTES: 400
ABSOLUTE NEUTROPHILS: 4844
BASOPHILS: 0.9
EOSINOPHILS: 1.4
FERRITIN, SERUM: 449
HEMATOCRIT: 47.5
HEMOGLOBIN: 16.7
IRON BIND.CAP.(TIBC): 349
IRON SATURATION: 62
IRON: 218
LYMPHOCYTES: 21.7
MCH: 34.4
MCHC: 35.2
MCV: 97.9
MONOCYTES: 5.8
MPV: 9.4
NEUTROPHILS: 70.2
PLATELET COUNT: 168
RDW: 11.6
RED BLOOD CELL COUNT: 4.85
WHITE BLOOD CELL COUNT: 6.9

## 2024-06-26 ENCOUNTER — WEB ENCOUNTER (OUTPATIENT)
Dept: URBAN - METROPOLITAN AREA CLINIC 113 | Facility: CLINIC | Age: 54
End: 2024-06-26

## 2024-06-26 ENCOUNTER — LAB OUTSIDE AN ENCOUNTER (OUTPATIENT)
Dept: URBAN - METROPOLITAN AREA CLINIC 113 | Facility: CLINIC | Age: 54
End: 2024-06-26

## 2024-07-24 ENCOUNTER — LAB OUTSIDE AN ENCOUNTER (OUTPATIENT)
Dept: URBAN - METROPOLITAN AREA CLINIC 113 | Facility: CLINIC | Age: 54
End: 2024-07-24

## 2024-11-15 ENCOUNTER — ERX REFILL RESPONSE (OUTPATIENT)
Dept: URBAN - METROPOLITAN AREA CLINIC 113 | Facility: CLINIC | Age: 54
End: 2024-11-15

## 2024-11-15 RX ORDER — ONDANSETRON 4 MG/1
DISSOLVE ONE TABLET BY MOUTH THREE TIMES A DAY AS NEEDED TABLET, ORALLY DISINTEGRATING ORAL
Qty: 30 TABLET | Refills: 11 | OUTPATIENT

## 2024-11-15 RX ORDER — ONDANSETRON 8 MG/1
1 TABLET ON THE TONGUE AND ALLOW TO DISSOLVE TABLET, ORALLY DISINTEGRATING ORAL
Qty: 60 | Refills: 5 | OUTPATIENT

## 2025-01-02 ENCOUNTER — OFFICE VISIT (OUTPATIENT)
Dept: URBAN - METROPOLITAN AREA CLINIC 113 | Facility: CLINIC | Age: 55
End: 2025-01-02
Payer: COMMERCIAL

## 2025-01-02 VITALS
WEIGHT: 278 LBS | BODY MASS INDEX: 32.82 KG/M2 | HEIGHT: 77 IN | RESPIRATION RATE: 20 BRPM | HEART RATE: 79 BPM | DIASTOLIC BLOOD PRESSURE: 89 MMHG | SYSTOLIC BLOOD PRESSURE: 127 MMHG | TEMPERATURE: 97.7 F

## 2025-01-02 DIAGNOSIS — K58.0 IRRITABLE BOWEL SYNDROME WITH DIARRHEA: ICD-10-CM

## 2025-01-02 DIAGNOSIS — E83.110 HEREDITARY HEMOCHROMATOSIS: ICD-10-CM

## 2025-01-02 DIAGNOSIS — M10.00 IDIOPATHIC GOUT, UNSPECIFIED CHRONICITY, UNSPECIFIED SITE: ICD-10-CM

## 2025-01-02 PROBLEM — 24595009: Status: ACTIVE | Noted: 2025-01-02

## 2025-01-02 PROCEDURE — 99214 OFFICE O/P EST MOD 30 MIN: CPT | Performed by: NURSE PRACTITIONER

## 2025-01-02 RX ORDER — LOSARTAN POTASSIUM 50 MG/1
1 TABLET TABLET ORAL ONCE A DAY
Status: ON HOLD | COMMUNITY

## 2025-01-02 RX ORDER — ATORVASTATIN CALCIUM 10 MG/1
1 TABLET TABLET, FILM COATED ORAL ONCE A DAY
Status: ACTIVE | COMMUNITY

## 2025-01-02 RX ORDER — LORAZEPAM 0.5 MG/1
1 TABLET AT BEDTIME AS NEEDED TABLET ORAL ONCE A DAY
Status: ON HOLD | COMMUNITY

## 2025-01-02 RX ORDER — DESVENLAFAXINE 50 MG/1
1 TABLET TABLET, EXTENDED RELEASE ORAL ONCE A DAY
Status: ON HOLD | COMMUNITY

## 2025-01-02 RX ORDER — LOSARTAN POTASSIUM 25 MG/1
1 TABLET TABLET ORAL ONCE A DAY
Status: ON HOLD | COMMUNITY

## 2025-01-02 RX ORDER — ATENOLOL 50 MG/1
1 TABLET TABLET ORAL ONCE A DAY
Status: ACTIVE | COMMUNITY

## 2025-01-02 RX ORDER — ONDANSETRON 8 MG/1
1 TABLET ON THE TONGUE AND ALLOW TO DISSOLVE TABLET, ORALLY DISINTEGRATING ORAL
Qty: 60 | Refills: 5 | Status: ACTIVE | COMMUNITY

## 2025-01-02 RX ORDER — DESVENLAFAXINE 100 MG/1
1 TABLET TABLET, EXTENDED RELEASE ORAL ONCE A DAY
Status: ON HOLD | COMMUNITY

## 2025-01-02 RX ORDER — ALLOPURINOL 200 MG/1
1 TABLET TABLET ORAL ONCE A DAY
Status: ACTIVE | COMMUNITY

## 2025-01-02 RX ORDER — COLESEVELAM HYDROCHLORIDE 625 MG/1
1 TABLET TABLET, FILM COATED ORAL ONCE A DAY
Qty: 30 | Refills: 11 | Status: ON HOLD | COMMUNITY
Start: 2021-12-28

## 2025-01-02 NOTE — HPI-TODAY'S VISIT:
He was seen in the office in June for follow-up regarding hereditary hemochromatosis, responsive to phlebotomy.  Labs were planned to trend his hemoglobin and iron stores.  Recent labs with his PCP showed normal LFTs.  A repeat abdominal ultrasound with elastography was considered in 1-2 years. Regarding IBS-D, his symptoms were managed with daily Metamucil capsules and Imodium when needed. Labs 6/20/2024:H/H16.7/47.5, MCV 97.9, , WBC 6.9.  Ferritin 449.  Iron 218, TIBC 349, iron sat 62. He was recommended weekly phlebotomy x2 then every other week, with plan for repeat labs in 4-6 weeks. He ultimately proceeded with double red cell donation. It does not appear the labs were completed. He continues to do fairly well from a GI standpoint. He has intermittent diarrhea, with episodes of up to 6 loose stools in the morning. This responds to Imodium as required. He denies abdominal pain or blood in the stool. He is undergoing phlebotomy every 6-8 weeks currently. He is considering Ozempic for weight loss. age

## 2025-01-02 NOTE — HPI-OTHER HISTORIES
Labs in May 2024 with his PCP show a hemoglobin of 15.2. His LFTs were normal.  Labs 2/1/24: H/H 15.8/43.6, MCV 98.6, Plt 151, WBC 5.9. AST 64, ALT 54, ALP 82, Tbili 0.5, CMP otherwise unremarkable aside from glucose 164. Iron 109, TIBC 291, iron sat 37. Ferritin 342.  RUQ ultrasound with elastography 12/4/2023:Hepatic steatosis, elastography value 7.69 consistent with normal to mild liver stiffness.  Labs 9/18/2023:H/H14.4/40.4, .5, , WBC 5.9. AST 36, ALT 47, ALP 76, T. bili 0.8, CMP otherwise unremarkable aside from glucose 136. Iron 265, TIBC 293, iron sat 90. Ferritin 1173. He was recommended weekly phlebotomy x 4.  He completed a few rounds of phlebotomy over the summer 2023 and resumed double red [facility]CK[facility]. He is only able to donate RBCs every 90 days.  Labs from December 2022 show H/H15.3/42.7, MCV 98.8, , WBC 7.1. CMP unremarkable with normal LFTs, AST 26, ALT 42, ALP 79, T. bili 0.7. Ferritin 819. aFP 3.0. Iron 204, TIBC 269, iron sat 76. Negative celiac serologies. PT/INR 9.8/0.9.  MRI abdomen with and without contrast February 2023.  Right renal lesion corresponding to prominent cortical lobulation.  No mass was noted.  Moderate diffuse hepatic steatosis noted..  Borderline splenomegaly.  Abdominal ultrasound elastography.  December 2022.  Fatty liver noted.  F1.  Elastography score 6.83K PA.  Possible mass seen in the right kidney.  Colonoscopy August 2022.  2 mm sessile descending colon polyp.  This was cold biopsied.  Normal colonic mucosa.  Terminal ileum was normal.  Random biopsies were negative.  Descending polyp was a tubular adenoma.  He has a history of hemochromatosis and has received many phlebotomies over the years.   He most recently underwent a double red cell (FRANCK) donation in June in [place]. Last run was delayed due to his father's death.  Labs August 2022.  Iron 195, iron saturation 69%, ferritin 892.  Labs August 2022.  Iron 195, iron saturation 69%, ferritin 892.Colonoscopy August 2022.  2 mm descending colon polyp.  Normal colonic mucosa.  Random biopsies were negative for colitis.  The polyp was adenomatous.  Labs April 2022.  ALT 38, AST 25, alkaline phosphatase 111.  TSH 1.2.  Ferritin 699, iron 237.  Hemoglobin 16.1.  Hemoglobin 15.6  EGD ~ 2012 [facility]. Normal. Per his report  Labs December 2021 albumin 4.5, total bilirubin 1.1, alkaline phosphatase 78, AST 24, ALT 36.  Hemoglobin 17.1, platelets 230,000.  Ferritin 544.  Hemoglobin A1c 4.7.  Uric acid normal at 7.0   Labs 3/4/21: H/H 17.9/50.3, MCV 99.2. Iron 222, TIBC 292, iron sat 76, ferritin 709.  Flexible anoscopy 2018.  Normal.  Biopsies were notable for mild ischemic colitis interestingly  Hemorrhoidal banding performed 2017  Colonoscopy 2017.  5 mm transverse colonic polyp.  Random biopsies were normal.  CT enterography - September 2015

## 2025-01-02 NOTE — HPI-TODAY'S VISIT:
55yo male with a history of hemochromatosis, diarrhea predominant irritable bowel syndrome, complicated internal hemorrhoids s/p banding x3, adenomatous colon polyps, presenting for follow-up.

## 2025-07-03 ENCOUNTER — OFFICE VISIT (OUTPATIENT)
Dept: URBAN - METROPOLITAN AREA CLINIC 113 | Facility: CLINIC | Age: 55
End: 2025-07-03
Payer: COMMERCIAL

## 2025-07-03 ENCOUNTER — LAB OUTSIDE AN ENCOUNTER (OUTPATIENT)
Dept: URBAN - METROPOLITAN AREA CLINIC 113 | Facility: CLINIC | Age: 55
End: 2025-07-03

## 2025-07-03 DIAGNOSIS — E83.110 HEREDITARY HEMOCHROMATOSIS: ICD-10-CM

## 2025-07-03 DIAGNOSIS — K58.0 IRRITABLE BOWEL SYNDROME WITH DIARRHEA: ICD-10-CM

## 2025-07-03 PROCEDURE — 99214 OFFICE O/P EST MOD 30 MIN: CPT | Performed by: NURSE PRACTITIONER

## 2025-07-03 RX ORDER — DESVENLAFAXINE 50 MG/1
1 TABLET TABLET, EXTENDED RELEASE ORAL ONCE A DAY
Status: ON HOLD | COMMUNITY

## 2025-07-03 RX ORDER — ALLOPURINOL 200 MG/1
1 TABLET TABLET ORAL ONCE A DAY
Status: ACTIVE | COMMUNITY

## 2025-07-03 RX ORDER — COLESEVELAM HYDROCHLORIDE 625 MG/1
1 TABLET TABLET, FILM COATED ORAL ONCE A DAY
Qty: 30 | Refills: 11 | Status: ON HOLD | COMMUNITY
Start: 2021-12-28

## 2025-07-03 RX ORDER — LOSARTAN POTASSIUM 50 MG/1
1 TABLET TABLET ORAL ONCE A DAY
Status: ON HOLD | COMMUNITY

## 2025-07-03 RX ORDER — DESVENLAFAXINE 100 MG/1
1 TABLET TABLET, EXTENDED RELEASE ORAL ONCE A DAY
Status: ON HOLD | COMMUNITY

## 2025-07-03 RX ORDER — LOSARTAN POTASSIUM 25 MG/1
1 TABLET TABLET ORAL ONCE A DAY
Status: ON HOLD | COMMUNITY

## 2025-07-03 RX ORDER — LORAZEPAM 0.5 MG/1
1 TABLET AT BEDTIME AS NEEDED TABLET ORAL ONCE A DAY
Status: ON HOLD | COMMUNITY

## 2025-07-03 RX ORDER — ATORVASTATIN CALCIUM 10 MG/1
1 TABLET TABLET, FILM COATED ORAL ONCE A DAY
Status: ACTIVE | COMMUNITY

## 2025-07-03 RX ORDER — ONDANSETRON 8 MG/1
1 TABLET ON THE TONGUE AND ALLOW TO DISSOLVE TABLET, ORALLY DISINTEGRATING ORAL
Qty: 60 | Refills: 5 | Status: ACTIVE | COMMUNITY

## 2025-07-03 RX ORDER — ATENOLOL 50 MG/1
1 TABLET TABLET ORAL ONCE A DAY
Status: ACTIVE | COMMUNITY

## 2025-07-03 NOTE — HPI-TODAY'S VISIT:
54yo male with a history of hemochromatosis, diarrhea predominant irritable bowel syndrome, complicated internal hemorrhoids s/p banding x3, adenomatous colon polyps, presenting for follow-up.

## 2025-07-03 NOTE — HPI-TODAY'S VISIT:
He was seen in the office in January for follow-up regarding hereditary hemochromatosis.  Labs were planned to trend his hemoglobin, ferritin and LFTs with recommendations on phlebotomy interval pending results.  An abdominal ultrasound was recommended at follow-up.  His symptoms related to IBS-D were overall stable on his regimen with fiber and Imodium.  Colestipol was considered.  He was considering starting GLP-1 therapy per his PCP. He was ultimately started on Wegovy and has lost almost 30 pounds on this therapy.  His abdominal symptoms have resolved.  He has not experienced further diarrhea or stool urgency.  He believes his last phlebotomy was performed around Feb/March of this year. Labs available from 2/6/2025 show a hemoglobin of 16.8.  AST 23, ALT 27, ALP 82, T. bili 0.7.  Iron sat 35.  Ferritin 178.

## 2025-07-03 NOTE — HPI-OTHER HISTORIES
Labs 6/20/2024:H/H16.7/47.5, MCV 97.9, , WBC 6.9. Ferritin 449. Iron 218, TIBC 349, iron sat 62. He was recommended weekly phlebotomy x2 then every other week, with plan for repeat labs in 4-6 weeks. He ultimately proceeded with double red cell donation  Labs in May 2024 with his PCP show a hemoglobin of 15.2. His LFTs were normal.  Labs 2/1/24: H/H 15.8/43.6, MCV 98.6, Plt 151, WBC 5.9. AST 64, ALT 54, ALP 82, Tbili 0.5, CMP otherwise unremarkable aside from glucose 164. Iron 109, TIBC 291, iron sat 37. Ferritin 342.  RUQ ultrasound with elastography 12/4/2023:Hepatic steatosis, elastography value 7.69 consistent with normal to mild liver stiffness.  Labs 9/18/2023:H/H14.4/40.4, .5, , WBC 5.9. AST 36, ALT 47, ALP 76, T. bili 0.8, CMP otherwise unremarkable aside from glucose 136. Iron 265, TIBC 293, iron sat 90. Ferritin 1173. He was recommended weekly phlebotomy x 4.  He completed a few rounds of phlebotomy over the summer 2023 and resumed double red cell (FRANCK) donation with the [facility]. He is only able to donate RBCs every 90 days.  Labs from December 2022 show H/H15.3/42.7, MCV 98.8, , WBC 7.1. CMP unremarkable with normal LFTs, AST 26, ALT 42, ALP 79, T. bili 0.7. Ferritin 819. aFP 3.0. Iron 204, TIBC 269, iron sat 76. Negative celiac serologies. PT/INR 9.8/0.9.  MRI abdomen with and without contrast February 2023.  Right renal lesion corresponding to prominent cortical lobulation.  No mass was noted.  Moderate diffuse hepatic steatosis noted..  Borderline splenomegaly.  Abdominal ultrasound elastography.  December 2022.  Fatty liver noted.  F1.  Elastography score 6.83K PA.  Possible mass seen in the right kidney.  Colonoscopy August 2022.  2 mm sessile descending colon polyp.  This was cold biopsied.  Normal colonic mucosa.  Terminal ileum was normal.  Random biopsies were negative.  Descending polyp was a tubular adenoma.  He has a history of hemochromatosis and has received many phlebotomies over the years.   He most recently underwent a double red cell (FRANCK) donation in June in [place]. Last run was delayed due to his father's death.  Labs August 2022.  Iron 195, iron saturation 69%, ferritin 892.  Labs August 2022.  Iron 195, iron saturation 69%, ferritin 892.Colonoscopy August 2022.  2 mm descending colon polyp.  Normal colonic mucosa.  Random biopsies were negative for colitis.  The polyp was adenomatous.  Labs April 2022.  ALT 38, AST 25, alkaline phosphatase 111.  TSH 1.2.  Ferritin 699, iron 237.  Hemoglobin 16.1.  Hemoglobin 15.6  EGD ~ 2012 [facility]. Normal. Per his report  Labs December 2021 albumin 4.5, total bilirubin 1.1, alkaline phosphatase 78, AST 24, ALT 36.  Hemoglobin 17.1, platelets 230,000.  Ferritin 544.  Hemoglobin A1c 4.7.  Uric acid normal at 7.0   Labs 3/4/21: H/H 17.9/50.3, MCV 99.2. Iron 222, TIBC 292, iron sat 76, ferritin 709.  Flexible anoscopy 2018.  Normal.  Biopsies were notable for mild ischemic colitis interestingly  Hemorrhoidal banding performed 2017  Colonoscopy 2017.  5 mm transverse colonic polyp.  Random biopsies were normal.  CT enterography - September 2015

## 2025-07-04 LAB
A/G RATIO: 2.3
ABSOLUTE BASOPHILS: 49
ABSOLUTE EOSINOPHILS: 98
ABSOLUTE LYMPHOCYTES: 1696
ABSOLUTE MONOCYTES: 506
ABSOLUTE NEUTROPHILS: 3752
ALBUMIN: 4.3
ALKALINE PHOSPHATASE: 66
ALT (SGPT): 21
AST (SGOT): 20
BASOPHILS: 0.8
BILIRUBIN, TOTAL: 1
BUN/CREATININE RATIO: (no result)
BUN: 12
CALCIUM: 9.2
CARBON DIOXIDE, TOTAL: 26
CHLORIDE: 102
CREATININE: 1.06
EGFR: 82
EOSINOPHILS: 1.6
FERRITIN, SERUM: 233
GLOBULIN, TOTAL: 1.9
GLUCOSE: 104
HEMATOCRIT: 48.8
HEMOGLOBIN: 16.9
IRON BIND.CAP.(TIBC): 339
IRON SATURATION: 97
IRON: 329
LYMPHOCYTES: 27.8
MCH: 35.2
MCHC: 34.6
MCV: 101.7
MONOCYTES: 8.3
MPV: 10.4
NEUTROPHILS: 61.5
PLATELET COUNT: 136
POTASSIUM: 4.5
PROTEIN, TOTAL: 6.2
RDW: 12.8
RED BLOOD CELL COUNT: 4.8
SODIUM: 137
WHITE BLOOD CELL COUNT: 6.1